# Patient Record
Sex: MALE | Race: WHITE | NOT HISPANIC OR LATINO | Employment: FULL TIME | RURAL
[De-identification: names, ages, dates, MRNs, and addresses within clinical notes are randomized per-mention and may not be internally consistent; named-entity substitution may affect disease eponyms.]

---

## 2021-06-23 ENCOUNTER — TRANSCRIBE ORDERS (OUTPATIENT)
Dept: PHYSICAL THERAPY | Facility: CLINIC | Age: 66
End: 2021-06-23

## 2021-06-23 DIAGNOSIS — S52.201D UNSPECIFIED FRACTURE OF SHAFT OF RIGHT ULNA, SUBSEQUENT ENCOUNTER FOR CLOSED FRACTURE WITH ROUTINE HEALING: Primary | ICD-10-CM

## 2021-06-25 ENCOUNTER — TREATMENT (OUTPATIENT)
Dept: PHYSICAL THERAPY | Facility: CLINIC | Age: 66
End: 2021-06-25

## 2021-06-25 DIAGNOSIS — S52.264D: Primary | ICD-10-CM

## 2021-06-25 PROCEDURE — 97110 THERAPEUTIC EXERCISES: CPT | Performed by: PHYSICAL THERAPIST

## 2021-06-25 PROCEDURE — 97161 PT EVAL LOW COMPLEX 20 MIN: CPT | Performed by: PHYSICAL THERAPIST

## 2021-06-25 NOTE — PROGRESS NOTES
Physical Therapy Initial Evaluation and Plan of Care      Patient: Mayo Luther   : 1955  Diagnosis/ICD-10 Code:  Closed nondisplaced segmental fracture of shaft of right ulna with routine healing, subsequent encounter [S52.264D]  Referring practitioner: JACQUES Martin  Date of Initial Visit: 2021  Today's Date: 2021  Patient seen for 1 sessions    Recertification: 2021   Next MD appt: 6 weeks         Subjective Questionnaire: QuickDASH: 2.27      Subjective Evaluation    History of Present Illness  Date of onset: 2021  Date of surgery: 2021    Subjective comment: Pt relates fell from platform landing on the platform below, hitting R forearm on rail, sustaining fx of ulna, 2021. Underwent ORIF R ulna 2021.  Relates was casted about 3 weeks. States returned to work , on trial light duty with restricted lifting weight, no ladder climbing,  Pt denies pain in R f/a.  Relaes I in ADLs, driving, performng work duties.  States has ROM deficit in pronation/suoination, wrist flex/ extensoin  and end range elbow extension.   Patient Occupation: maintainence,    Precautions and Work Restrictions: no pushing/pulling/lifting > 5#, no climbing ladders, Quality of life: good    Pain  Current pain ratin    Social Support  Lives with: spouse    Hand dominance: right    Patient Goals  Patient goals for therapy: increased strength, return to work and increased motion  Patient goal: resume former hobbies, work capacity, ROM/ strength           Objective          Active Range of Motion     Left Wrist   Wrist flexion: 65 degrees   Wrist extension: 71 degrees   Radial deviation: 20 degrees   Ulnar deviation: 30 degrees     Right Wrist   Wrist flexion: 55 degrees   Wrist extension: 35 degrees   Radial deviation: 18 degrees   Ulnar deviation: 28 degrees     Additional Active Range of Motion Details  R pronation limited 30%, supination limited  50%    Strength/Myotome Testing     Left Wrist/Hand   Normal wrist strength     (2nd hand position)     Trial 1: 75 lbs    Trial 2: 60 lbs    Trial 3: 70 lbs    Average: 68.33 lbs    Right Wrist/Hand   Wrist extension: 4-  Wrist flexion: 4-  Radial deviation: 4-  Ulnar deviation: 4-     (2nd hand position)     Trial 1: 60 lbs    Trial 2: 60 lbs    Trial 3: 65 lbs    Average: 61.67 lbs    Additional Strength Details  Bicep /tricep R 4/5     General Comments     Wrist/Hand Comments  Well healed incision along R ulna    Non TTP over ulna    R elbow flexion WNL, trace limitation to full extension         Assessment & Plan     Assessment  Impairments: abnormal or restricted ROM and lacks appropriate home exercise program  Other impairment: decreased strength in R UE  Assessment details: Limitations: limited pro/supination, wrist flex/ext, .     Pt currently on work restrictions.     Pt presents S/P R ulnar ORIF, 5/13/21, residual limitation in wrist and elbow AROM, strength deficits.  Pt would benefit from skilled PT intervention to maximize return to PLOF, resume work activities, resume hobbies and to facilitate successful return to full work duties.  Barriers to therapy: none noted at this time  Prognosis: good  Prognosis details: Pt motivated and gave good effort during evaluation.     Goals  Plan Goals: STG:    Pt will :    -be I in HEP each visit.   -demonstrate increased R wrist flexion to 65.  -demonstrate increased R wrist ext 45  -demonstrate increased R wrist pron/supination limitation 25%  -demonstrate increased R  65,65,65 or more in 3 trials    LTG:    Pt will demonstrate:    -R wrist AROM to within 5-8  degrees of L.   -R UE strength 4+/5, no c/opain  -per Quick dash,min/no limitations recreationally or with work duties.   -I in final HEP with progresion parameters.     Plan  Therapy options: will be seen for skilled physical therapy services  Planned therapy interventions: therapeutic  activities, stretching, strengthening, neuromuscular re-education, flexibility and home exercise program  Other planned therapy interventions: UE/wrist  stabilization training  Duration in visits: 6  Treatment plan discussed with: patient      Other therapeutic activities and/or exercises will be prescribed depending on the patients progress or lack there of.     Visit Diagnoses:    ICD-10-CM ICD-9-CM   1. Closed nondisplaced segmental fracture of shaft of right ulna with routine healing, subsequent encounter  S52.264D V54.12       Timed:  Manual Therapy:         mins  27610;  Therapeutic Exercise:    15     mins  58799;      Neuromuscular Clay:        mins  30110;    Therapeutic Activity:          mins  98379;     Gait Training:           mins  45523;     Ultrasound:          mins  17471;    Paraffin:        mins  72115;  Electrical Stimulation:         mins  40081 ( );    Untimed:  Electrical Stimulation:         mins  32943 ( );  Mechanical Traction:         mins  98183;     Timed Treatment:   15   mins   Total Treatment:     50   mins    PT SIGNATURE: Teresa Hancock PT   DATE TREATMENT INITIATED: 6/25/2021       This document has been electronically signed by Teresa Hancock PT on June 25, 2021 14:08 CDT    Initial Certification  Certification Period: 9/23/2021  I certify that the therapy services are furnished while this patient is under my care.  The services outlined above are required by this patient, and will be reviewed every 90 days.     PHYSICIAN: Justin Goldberg MD PhD      DATE:     Please sign and return via fax to [phone].. Thank you, [facility].        This document has been electronically signed by Teresa Hancock PT on June 25, 2021 14:08 CDT

## 2021-06-29 ENCOUNTER — TREATMENT (OUTPATIENT)
Dept: PHYSICAL THERAPY | Facility: CLINIC | Age: 66
End: 2021-06-29

## 2021-06-29 DIAGNOSIS — S52.264D: Primary | ICD-10-CM

## 2021-06-29 PROCEDURE — 97110 THERAPEUTIC EXERCISES: CPT | Performed by: PHYSICAL THERAPIST

## 2021-06-29 PROCEDURE — 97140 MANUAL THERAPY 1/> REGIONS: CPT | Performed by: PHYSICAL THERAPIST

## 2021-06-29 NOTE — PROGRESS NOTES
Physical Therapy Daily Treatment Note      Patient: Mayo Luther   : 1955  Referring practitioner: LUIS Martin*  Date of Initial Visit: Type: THERAPY  Noted: 2021  Today's Date: 2021  Patient seen for 2 sessions    Recertification: 2021   Next MD appt: 6 weeks     Mayo Luther reports: n/a        Subjective Evaluation    History of Present Illness    Subjective comment: pt states that he is doing well just stiff. Pain  Current pain ratin           Objective           General Comments     Wrist/Hand Comments  Incision closed, clean and dry     See Exercise, Manual, and Modality Logs for complete treatment.       Assessment & Plan     Assessment  Assessment details: Pt did well with treatment. Pt was tight with PROM stretching particularly in wrist flexion and forearm supination. Added CT1/CT2 S to HEP by written hand out    Goals  Plan Goals: STG:    Pt will :    -be I in HEP each visit.   -demonstrate increased R wrist flexion to 65.  -demonstrate increased R wrist ext 45  -demonstrate increased R wrist pron/supination limitation 25%  -demonstrate increased R  65,65,65 or more in 3 trials    LTG:    Pt will demonstrate:    -R wrist AROM to within 5-8  degrees of L.   -R UE strength 4+/5, no c/opain  -per Quick dash,min/no limitations recreationally or with work duties.   -I in final HEP with progresion parameters.       Plan  Plan details: Try power web  and hammer for RD/UD and pro/sup        Visit Diagnoses:    ICD-10-CM ICD-9-CM   1. Closed nondisplaced segmental fracture of shaft of right ulna with routine healing, subsequent encounter  S52.264D V54.12       Progress per Plan of Care and Progress strengthening /stabilization /functional activity           Timed:  Manual Therapy:    10     mins  07815;  Therapeutic Exercise:    35     mins  97335;     Neuromuscular Clay:        mins  70566;    Therapeutic Activity:          mins  66964;     Gait Training:            mins  44727;     Ultrasound:          mins  04047;    Electrical Stimulation:         mins  22140 ( );    Untimed:  Electrical Stimulation:         mins  22649 ( );  Mechanical Traction:         mins  46336;     Timed Treatment:   45   mins   Total Treatment:     45   mins  Oralia Hutchison PTA  Physical Therapist

## 2021-07-06 ENCOUNTER — TREATMENT (OUTPATIENT)
Dept: PHYSICAL THERAPY | Facility: CLINIC | Age: 66
End: 2021-07-06

## 2021-07-06 DIAGNOSIS — S52.264D: Primary | ICD-10-CM

## 2021-07-06 PROCEDURE — 97110 THERAPEUTIC EXERCISES: CPT | Performed by: PHYSICAL THERAPIST

## 2021-07-06 NOTE — PROGRESS NOTES
Physical Therapy Daily Progress Note    Patient: Mayo Luther   : 1955  Diagnosis/ICD-10 Code:     Diagnosis Plan   1. Closed nondisplaced segmental fracture of shaft of right ulna with routine healing, subsequent encounter       Referring practitioner: LUIS Martin*  Date of Initial Visit: Type: THERAPY  Noted: 2021  Today's Date: 2021  Patient seen for 3 sessions      PT Recheck Due: 2021  PT MD Visit: 2021       Mayo Luther      Subjective Evaluation    History of Present Illness    Subjective comment: Pt states he was doing some shoveling over the weekend and that made his arm sore, but the soreness went away within a few hours.  Pain  Current pain ratin             Objective   See Exercise, Manual, and Modality Logs for complete treatment.       Assessment & Plan     Assessment  Assessment details: Pt with good effort and fair technique.  Cues for improved stretching into wrist flexion for HEP. Verablly reviewed se of theraputty at home.    Tolerated addition of resistance with pronation/supination without pain.  Able to self correct form when compensating.  Declines ice.      Goals  Plan Goals: STG:    Pt will :    1.  be I in HEP each visit.   2.  demonstrate increased R wrist flexion to 65.  3.  demonstrate increased R wrist ext 45  4.  demonstrate increased R wrist pron/supination limitation 25%  5.  demonstrate increased R  65,65,65 or more in 3 trials    LTG:    Pt will demonstrate:    6.  R wrist AROM to within 5-8  degrees of L.   7.  R UE strength 4+/5, no c/opain  8.  per Quick dash,min/no limitations recreationally or with work duties.   9.  I in final HEP with progresion parameters    Plan  Plan details: Next visit add long handle velcro roller.        Progress per Plan of Care and Progress strengthening /stabilization /functional activity            Timed:  Manual Therapy:         mins  65035;  Therapeutic Exercise:    49     mins  10745;    Aquatic Therex :        mins  59208    Neuromuscular Clay:        mins  89244;    Therapeutic Activity:          mins  36599;     Gait Training:           mins  35951;     Ultrasound:          mins  96987;    Electrical Stimulation:         mins  13970 ( );    Untimed:  Electrical Stimulation:         mins  19130 ( );  Mechanical Traction:         mins  49511;   Orthotic fit/train:         mins  29871    Timed Treatment:   49   mins   Total Treatment:     49   mins  Maria Del Carmen Cabrera PTA  Physical Therapist Assistant        This document has been electronically signed by Maria Del Carmen Cabrera PTA on July 6, 2021 13:45 CDT

## 2021-07-08 ENCOUNTER — TREATMENT (OUTPATIENT)
Dept: PHYSICAL THERAPY | Facility: CLINIC | Age: 66
End: 2021-07-08

## 2021-07-08 DIAGNOSIS — S52.264D: Primary | ICD-10-CM

## 2021-07-08 PROCEDURE — 97110 THERAPEUTIC EXERCISES: CPT | Performed by: PHYSICAL THERAPIST

## 2021-07-08 PROCEDURE — 97140 MANUAL THERAPY 1/> REGIONS: CPT | Performed by: PHYSICAL THERAPIST

## 2021-07-08 NOTE — PROGRESS NOTES
"Physical Therapy Daily Progress Note    Patient: Mayo Luther   : 1955  Diagnosis/ICD-10 Code:     Diagnosis Plan   1. Closed nondisplaced segmental fracture of shaft of right ulna with routine healing, subsequent encounter       Referring practitioner: LUIS Martin*  Date of Initial Visit: Type: THERAPY  Noted: 2021  Today's Date: 2021  Patient seen for 4 sessions      PT Recheck Due: 2021  PT MD Visit: 2021       Mayo Luther reports 70-80% improvement.       Subjective Evaluation    History of Present Illness    Subjective comment: Pt states his arm is doing fine.  He has been at work all day and he uses his arm all day.  No pain to report.  Pain  Current pain ratin             Objective   See Exercise, Manual, and Modality Logs for complete treatment.       Assessment & Plan     Assessment  Assessment details: Pt gives good effort with all therex.  Improved carryover with stretches this date.  No increased complaints of pain with increased resistance or new activity.  Pt states \"he can feel it working\"  Manual work to soft tissue of forearm and wrist for tissue mobility.      Goals  Plan Goals: STG:    Pt will :    1.  be I in HEP each visit.  (met/ongoing)   2.  demonstrate increased R wrist flexion to 65.  3.  demonstrate increased R wrist ext 45  4.  demonstrate increased R wrist pron/supination limitation 25%  5.  demonstrate increased R  65,65,65 or more in 3 trials    LTG:    Pt will demonstrate:    6.  R wrist AROM to within 5-8  degrees of L.   7.  R UE strength 4+/5, no c/opain  8.  per Quick dash,min/no limitations recreationally or with work duties.   9.  I in final HEP with progresion parameters    Plan  Plan details: Next visit add hammer/vibration activity      Progress per Plan of Care and Progress strengthening /stabilization /functional activity            Timed:  Manual Therapy:    10     mins  35125;  Therapeutic Exercise:    35     mins  " 95430;   Aquatic Therex :       mins  08265    Neuromuscular Clay:        mins  05565;    Therapeutic Activity:          mins  26829;     Gait Training:           mins  87002;     Ultrasound:          mins  31407;    Electrical Stimulation:         mins  39367 ( );    Untimed:  Electrical Stimulation:         mins  88036 ( );  Mechanical Traction:         mins  68699;   Orthotic fit/train:         mins  34183    Timed Treatment:   45   mins   Total Treatment:     45   mins  Maria Del Carmen Cabrera PTA  Physical Therapist Assistant        This document has been electronically signed by MariaD el Carmen Cabrera PTA on July 8, 2021 13:51 CDT

## 2021-07-12 ENCOUNTER — TREATMENT (OUTPATIENT)
Dept: PHYSICAL THERAPY | Facility: CLINIC | Age: 66
End: 2021-07-12

## 2021-07-12 DIAGNOSIS — S52.264D: Primary | ICD-10-CM

## 2021-07-12 PROCEDURE — 97110 THERAPEUTIC EXERCISES: CPT | Performed by: PHYSICAL THERAPIST

## 2021-07-12 NOTE — PROGRESS NOTES
Physical Therapy Daily Progress Note    Patient: Mayo Luther   : 1955  Diagnosis/ICD-10 Code:     Diagnosis Plan   1. Closed nondisplaced segmental fracture of shaft of right ulna with routine healing, subsequent encounter       Referring practitioner: LUIS Martin*  Date of Initial Visit: Type: THERAPY  Noted: 2021  Today's Date: 2021  Patient seen for 5 sessions      PT Recheck Due: 2021  PT MD Visit: 2021       Mayo Luther       Subjective Evaluation    History of Present Illness    Subjective comment: pt states his arm is feeling pretty good.  Had to crawl under house over weekend and that made his arm sore.  Pain  Current pain ratin             Objective          Strength/Myotome Testing     Right Wrist/Hand      (2nd hand position)     Trial 1: 80 lbs    Trial 2: 82 lbs    Trial 3: 86 lbs    Average: 82.67 lbs      See Exercise, Manual, and Modality Logs for complete treatment.       Assessment & Plan       Goals  Plan Goals: STG:    Pt will :    1.  be I in HEP each visit.  (met/ongoing)   2.  demonstrate increased R wrist flexion to 65.  3.  demonstrate increased R wrist ext 45  4.  demonstrate increased R wrist pron/supination limitation 25%  5.  demonstrate increased R  65,65,65 or more in 3 trials (MET)     LTG:    Pt will demonstrate:    6.  R wrist AROM to within 5-8  degrees of L.   7.  R UE strength 4+/5, no c/opain  8.  per Quick dash,min/no limitations recreationally or with work duties.   9.  I in final HEP with progresion parameters    Plan  Plan details: Next visit add wall pushups       Progress per Plan of Care and Progress strengthening /stabilization /functional activity            Timed:  Manual Therapy:        mins  46288;  Therapeutic Exercise:    43     mins  12143;   Aquatic Therex :        mins  68716    Neuromuscular Clay:        mins  53091;    Therapeutic Activity:          mins  85614;     Gait Training:           mins   31816;     Ultrasound:          mins  60755;    Electrical Stimulation:         mins  67501 ( );    Untimed:  Electrical Stimulation:         mins  44488 ( );  Mechanical Traction:         mins  51465;   Orthotic fit/train:         mins  23308    Timed Treatment:   43   mins   Total Treatment:     43   mins  Maria Del Carmen Cabrera PTA  Physical Therapist Assistant        This document has been electronically signed by Maria Del Carmen Cabrera PTA on July 12, 2021 13:51 CDT

## 2021-07-15 ENCOUNTER — TREATMENT (OUTPATIENT)
Dept: PHYSICAL THERAPY | Facility: CLINIC | Age: 66
End: 2021-07-15

## 2021-07-15 DIAGNOSIS — S52.264D: Primary | ICD-10-CM

## 2021-07-15 PROCEDURE — 97110 THERAPEUTIC EXERCISES: CPT | Performed by: PHYSICAL THERAPIST

## 2021-07-15 NOTE — PROGRESS NOTES
Physical Therapy Daily Progress Note    Patient: Mayo Luther   : 1955  Diagnosis/ICD-10 Code:     Diagnosis Plan   1. Closed nondisplaced segmental fracture of shaft of right ulna with routine healing, subsequent encounter       Referring practitioner: LUIS Martin*  Date of Initial Visit: Type: THERAPY  Noted: 2021  Today's Date: 7/15/2021  Patient seen for 6 sessions      PT Recheck Due: 2021  PT MD Visit: 2021       Mayo Luther reports: 80% improvement        Subjective Evaluation    History of Present Illness    Subjective comment: Pt states he is working full duty at work.  Has occasional pain in the wrist but nothing too bad. Pain  Current pain ratin             Objective   See Exercise, Manual, and Modality Logs for complete treatment.       Assessment & Plan     Assessment  Assessment details: Good effort with all therex.  No complaints of pain.  Some reports of a slight discomfort with wall pushups.      Goals  Plan Goals: STG:    Pt will :    1.  be I in HEP each visit.  (met/ongoing)   2.  demonstrate increased R wrist flexion to 65.  3.  demonstrate increased R wrist ext 45  4.  demonstrate increased R wrist pron/supination limitation 25%  5.  demonstrate increased R  65,65,65 or more in 3 trials (MET)     LTG:    Pt will demonstrate:    6.  R wrist AROM to within 5-8  degrees of L.   7.  R UE strength 4+/5, no c/opain  8.  per Quick dash,min/no limitations recreationally or with work duties.   9.  I in final HEP with progresion parameters    Plan  Plan details: Assess ROM anticipate DC by end of next week with minimal to no pain and no limitations.        Progress per Plan of Care and Anticipate DC next Visit            Timed:  Manual Therapy:         mins  65151;  Therapeutic Exercise:    40     mins  23287;   Aquatic Therex :        mins  23008    Neuromuscular Clay:        mins  81635;    Therapeutic Activity:          mins  21139;     Gait Training:            mins  00247;     Ultrasound:          mins  31807;    Electrical Stimulation:         mins  26035 ( );    Untimed:  Electrical Stimulation:         mins  48838 ( );  Mechanical Traction:         mins  90300;   Orthotic fit/train:         mins  62699    Timed Treatment:   40   mins   Total Treatment:     40   mins  Maria Del Carmen Cabrera PTA  Physical Therapist Assistant        This document has been electronically signed by Maria Del Carmen Cabrera PTA on July 15, 2021 13:54 CDT

## 2021-07-20 ENCOUNTER — TREATMENT (OUTPATIENT)
Dept: PHYSICAL THERAPY | Facility: CLINIC | Age: 66
End: 2021-07-20

## 2021-07-20 DIAGNOSIS — S52.264D: Primary | ICD-10-CM

## 2021-07-20 PROCEDURE — 97140 MANUAL THERAPY 1/> REGIONS: CPT | Performed by: PHYSICAL THERAPIST

## 2021-07-20 PROCEDURE — 97110 THERAPEUTIC EXERCISES: CPT | Performed by: PHYSICAL THERAPIST

## 2021-07-20 NOTE — PROGRESS NOTES
Re-Assessment / Re-Certification      Patient: Mayo Luther   : 1955  Diagnosis/ICD-10 Code:  Closed nondisplaced segmental fracture of shaft of right ulna with routine healing, subsequent encounter [S52.264D]  Referring practitioner: JACQUES Martin  Date of Initial Visit: Type: THERAPY  Noted: 2021  Today's Date: 2021  Patient seen for 7 sessions      PT MD Visit: 2021    Subjective:   Mayo Luther reports: improving.  Subjective Questionnaire: QuickDASH: 15.91    Clinical Progress: improved  Home Program Compliance: Yes  Treatment has included: therapeutic exercise, neuromuscular re-education, manual therapy, therapeutic activity and HEP instruction.    Subjective Evaluation    History of Present Illness  Date of surgery: 2021    Subjective comment: Pt relates R arm getting stronger. Relates feels chester on occasion in ulnar lateral wrist.  States notices limited end range pro/supination, wrist flex/ext, notices mild pain in ulnar wrist with gripping hard.  States has been working regular duty and doing chores at home.  Notes mild R wrist soreness after mowing the lawn.      Objective          Active Range of Motion     Right Wrist   Wrist flexion: 60 degrees   Wrist extension: 50 degrees   Radial deviation: 15 degrees   Ulnar deviation: 28 degrees     Additional Active Range of Motion Details  R wrist :     Pronation and supination limited aprox 10% with mild c/o at ulnar wrist beginning session;  After session , pt demonstrated 95% ROM pro/supination with min/no c/o at ulnar wrist    AROM: Flexion 60,Ext 50; with mil dc/o stiffness at end range,; beginning session.  After session mesured aprox 2-3 degrees further, but no c/o stiffness with AROM, all planes.     Strength/Myotome Testing     Additional Strength Details  :   R: 80,80,80#  L: 80,82, 80 #    Ambulation     Comments            General Comments     Wrist/Hand Comments  R wrist joint mobility : decreased  "joint play, mild stiffness         Assessment & Plan     Assessment  Impairments: abnormal or restricted ROM  Assessment details: Pt progressing nicely at 10.5 weeks.  Relates mild limitation with activities that require end range R wrist flexion, prior to today's session. Following session demonstrated pain free increased range.     Good tolerance to joint and soft tissue mobilization to R f/a, radius, wrist.  Increased wrist AROM, decreased c/o  \"stiffness\" , pinching and limitation.   Pt able to demonstrate end range flexion motion needed for tasks and denies restriction.  Pleased with progress.    Pt related understanding of new HEP today, parameters, and progression.     Good effort, compliant with HEP, motivated.   Barriers to therapy: none  Prognosis: good  Prognosis details: Manual work performed:    Fascial lengthening f/a, radial head mobilization, radius/ ulnar mobilization, carpal mobilization, wrist distraction with PROM    25 minutes      Goals  Plan Goals: Goals  Plan Goals: STG:    Pt will :    1.  be I in HEP each visit.  (met/ongoing)   2.  demonstrate increased R wrist flexion to 65.(7/20/21, 60 degrees)  3.  demonstrate increased R wrist ext 45 (7/20/21, 60 degrees)  4.  demonstrate increased R wrist pron/supination AROM 25%  (MET)   5.  demonstrate increased R  65,65,65 or more in 3 trials (MET)     LTG:    Pt will demonstrate:    6.  R wrist AROM to within 5-8  degrees of L. (MET)  7.  R UE strength 4+/5, no c/opain (progressing)  8.  per Quick dash,min/no limitations recreationally or with work duties. (7/20/21, scores 2- progressing)  9.  I in final HEP with progresion parameters (progressing)    Plan  Therapy options: will be seen for skilled physical therapy services  Planned therapy interventions: joint mobilization, home exercise program, functional ROM exercises, flexibility, manual therapy, neuromuscular re-education, soft tissue mobilization, stretching, strengthening and " therapeutic activities  Frequency: 1x week  Duration in visits: 1  Treatment plan discussed with: patient  Plan details: Will continue therapy one more visit this week to assess tolerance to joint and soft tissue mobilization, reassess ROM and strength  , and to progress HEP.       Anticipate D/C to HEP, pt agrees with this plan.    Pt will RTD 7/29/21.          Visit Diagnoses:    ICD-10-CM ICD-9-CM   1. Closed nondisplaced segmental fracture of shaft of right ulna with routine healing, subsequent encounter  S52.264D V54.12       Progress toward previous goals: Partially Met        Recommendations: Continue with recommendations will continue therapy 1 more visit to asssess tolerance to joint mobilization and progress HEP.   Timeframe: 1 week  Prognosis to achieve goals: good    PT Signature: Teresa Hancock PT      Based upon review of the patient's progress and continued therapy plan, it is my medical opinion that Mayo Luther should continue physical therapy treatment at [facility] PHYS[facility] TH[facility]  [facility] [address] [place] [address] [phone].    Signature: __________________________________  Justin Goldberg MD PhD    Timed:  Manual Therapy:    25     mins  24307;  Therapeutic Exercise:    20     mins  22072;     Neuromuscular Clay:        mins  35405;    Therapeutic Activity:          mins  05542;     Gait Training:           mins  44637;     Ultrasound:          mins  73876;    Paraffin:        mins  23139;  Electrical Stimulation:         mins  81715 ( );    Untimed:  Electrical Stimulation:         mins  17944 ( );  Mechanical Traction:         mins  18450;     Timed Treatment:   45   mins   Total Treatment:     50   mins            This document has been electronically signed by Teresa Hancock PT on July 20, 2021 15:02 CDT

## 2021-07-22 ENCOUNTER — TREATMENT (OUTPATIENT)
Dept: PHYSICAL THERAPY | Facility: CLINIC | Age: 66
End: 2021-07-22

## 2021-07-22 DIAGNOSIS — S52.264D: Primary | ICD-10-CM

## 2021-07-22 PROCEDURE — 97110 THERAPEUTIC EXERCISES: CPT | Performed by: PHYSICAL THERAPIST

## 2021-07-22 PROCEDURE — 97140 MANUAL THERAPY 1/> REGIONS: CPT | Performed by: PHYSICAL THERAPIST

## 2021-07-22 NOTE — PROGRESS NOTES
Physical Therapy Daily Progress Note/ Discharge Note      Patient: Mayo Luther   : 1955  Referring practitioner: LUIS Martin*  Date of Initial Visit: Type: THERAPY  Noted: 2021  Today's Date: 2021  Patient seen for 8 sessions           Mayo Luther reports: improvement      Subjective Evaluation    History of Present Illness    Subjective comment: States the R wrist continues to have improved ROM after the last session of mobilization , tissue work and strengthening. States noticed improvement in ADLs and self care tasks.   Reelates understanding of HEP given last sesion and progressiion parameters. Pain  Current pain ratin  Location: trace discomfort in med/ lat wrist at end range pro/supination           Objective           General Comments     Wrist/Hand Comments  Continued increase in R wrist ROM with decreased c/o tightness.  5% deficit in R pronation / supination , but improving.     See Exercise, Manual, and Modality Logs for complete treatment.       Assessment & Plan     Assessment  Assessment details: Good tolerance to mobilization and tissue work, good tissue response.  Increased R wrist/fa pro/ supination, wrist flexion/extension, elbow extension after session.    Pt relates understanding of new HEP issued today, understands progression parameters, precautions.     Pt is appropriate for D/C to HEP, pt agrees.     Goals  Plan Goals: Goals:   STG:    Pt will :  1.  be I in HEP each visit.  (met)   2.  demonstrate increased R wrist flexion to 65.(21, 60 degrees)  3.  demonstrate increased R wrist ext 45 (21, 60 degrees)  4.  demonstrate increased R wrist pron/supination AROM 25%  (MET)   5.  demonstrate increased R  65,65,65 or more in 3 trials (MET)     LTG:    Pt will demonstrate:    6.  R wrist AROM to within 5-8  degrees of L. (MET)  7.  R UE strength 4+/5, no c/opain (progressing)  8.  per Quick dash,min/no limitations recreationally or with work  duties. (7/20/21, scores 2- progressing)  9.  I in final HEP with progresion parameters (met)    Plan  Plan details: Will D/C PT to HEP, pt agrees with this plan.      Pt agrees to contact this facility if questions or concerns arise.         Visit Diagnoses:    ICD-10-CM ICD-9-CM   1. Closed nondisplaced segmental fracture of shaft of right ulna with routine healing, subsequent encounter  S52.264D V54.12                Timed:  Manual Therapy:    30     mins  26391;  Therapeutic Exercise:    15     mins  62371;     Neuromuscular Clay:        mins  03985;    Therapeutic Activity:          mins  69080;     Gait Training:           mins  39049;     Ultrasound:          mins  44888;    Paraffin:        mins  34934;  Electrical Stimulation:         mins  41599 ( );    Untimed:  Electrical Stimulation:        mins  73893 ( );  Mechanical Traction:         mins  84753;     Timed Treatment:   45   mins   Total Treatment:     55   mins    Teresa Hancock, PT,  Physical Therapist        This document has been electronically signed by Teresa Hancock PT on July 22, 2021 18:07 CDT